# Patient Record
Sex: FEMALE | Race: ASIAN | NOT HISPANIC OR LATINO | Employment: FULL TIME | URBAN - METROPOLITAN AREA
[De-identification: names, ages, dates, MRNs, and addresses within clinical notes are randomized per-mention and may not be internally consistent; named-entity substitution may affect disease eponyms.]

---

## 2020-03-25 ENCOUNTER — TELEPHONE (OUTPATIENT)
Dept: SCHEDULING | Facility: IMAGING CENTER | Age: 50
End: 2020-03-25

## 2020-04-06 ENCOUNTER — OFFICE VISIT (OUTPATIENT)
Dept: MEDICAL GROUP | Facility: PHYSICIAN GROUP | Age: 50
End: 2020-04-06
Payer: COMMERCIAL

## 2020-04-06 VITALS
SYSTOLIC BLOOD PRESSURE: 110 MMHG | HEART RATE: 84 BPM | TEMPERATURE: 97.4 F | DIASTOLIC BLOOD PRESSURE: 80 MMHG | OXYGEN SATURATION: 97 % | HEIGHT: 64 IN | WEIGHT: 168 LBS | RESPIRATION RATE: 16 BRPM | BODY MASS INDEX: 28.68 KG/M2

## 2020-04-06 DIAGNOSIS — G89.29 CHRONIC PAIN OF BOTH KNEES: Primary | ICD-10-CM

## 2020-04-06 DIAGNOSIS — Z12.11 SCREENING FOR COLORECTAL CANCER: ICD-10-CM

## 2020-04-06 DIAGNOSIS — Z12.12 SCREENING FOR COLORECTAL CANCER: ICD-10-CM

## 2020-04-06 DIAGNOSIS — M25.561 CHRONIC PAIN OF BOTH KNEES: Primary | ICD-10-CM

## 2020-04-06 DIAGNOSIS — M25.562 CHRONIC PAIN OF BOTH KNEES: Primary | ICD-10-CM

## 2020-04-06 DIAGNOSIS — Z23 NEED FOR VACCINATION: ICD-10-CM

## 2020-04-06 PROCEDURE — 99203 OFFICE O/P NEW LOW 30 MIN: CPT | Performed by: FAMILY MEDICINE

## 2020-04-06 RX ORDER — TRAMADOL HYDROCHLORIDE 50 MG/1
50 TABLET ORAL NIGHTLY PRN
COMMUNITY
End: 2020-07-08

## 2020-04-06 RX ORDER — IBUPROFEN 800 MG/1
800 TABLET ORAL EVERY 8 HOURS PRN
COMMUNITY
End: 2020-07-09 | Stop reason: SDUPTHER

## 2020-04-06 ASSESSMENT — PATIENT HEALTH QUESTIONNAIRE - PHQ9: CLINICAL INTERPRETATION OF PHQ2 SCORE: 0

## 2020-04-06 NOTE — ASSESSMENT & PLAN NOTE
This is a chronic condition. For the last 6-8 months she has had bilateral knee pain. She had been taking tramadol over 6 months. She has been out for the last month and has been using ibuprofen and tylenol one daily as needed for pain. She states that it seems to be controlling her symptoms. She has not done PT, seen ortho or sports medicine. She did get x-rays which showed arthritis.

## 2020-04-06 NOTE — PROGRESS NOTES
Subjective:     CC:  Diagnoses of Chronic pain of both knees, Screening for colorectal cancer, and Need for vaccination were pertinent to this visit.    HISTORY OF THE PRESENT ILLNESS: Patient is a 50 y.o. female. This pleasant patient is here today to establish care and discuss knee pain. Her prior PCP was with [facility] in [place], [place].    Chronic pain of both knees  This is a chronic condition. For the last 6-8 months she has had bilateral knee pain. She had been taking tramadol over 6 months. She has been out for the last month and has been using ibuprofen and tylenol one daily as needed for pain. She states that it seems to be controlling her symptoms. She has not done PT, seen ortho or sports medicine. She did get x-rays which showed arthritis.      Allergies: Patient has no known allergies.    Current Outpatient Medications Ordered in Epic   Medication Sig Dispense Refill   • ibuprofen (MOTRIN) 800 MG Tab Take 800 mg by mouth every 8 hours as needed.     • tramadol (ULTRAM) 50 MG Tab Take 50 mg by mouth at bedtime as needed.       No current Epic-ordered facility-administered medications on file.        History reviewed. No pertinent past medical history.    Past Surgical History:   Procedure Laterality Date   • CYST EXCISION      scalp - likely sebaceous   • LACERATION REPAIR         Social History     Tobacco Use   • Smoking status: Never Smoker   • Smokeless tobacco: Never Used   Substance Use Topics   • Alcohol use: Not Currently   • Drug use: Not Currently       Social History     Social History Narrative   • Not on file       Family History   Problem Relation Age of Onset   • Lung Disease Mother         asthma   • Diabetes Father    • Cancer Neg Hx    • Heart Disease Neg Hx    • Stroke Neg Hx    • Drug abuse Neg Hx    • Alcohol abuse Neg Hx        Health Maintenance: Requesting records    ROS:   Gen: no fevers/chills  Eyes: no changes in vision  ENT: + hearing loss - tiny bit  Pulm: no cough  CV:  no  palpitations  GI: no diarrhea  : no dysuria  MSk: + myalgias - sometime R trapezius  Skin: no rash  Neuro: no numbness/tingling      Objective:     Exam: /80 (BP Location: Right arm, Patient Position: Sitting, BP Cuff Size: Adult)   Pulse 84   Temp 36.3 °C (97.4 °F) (Temporal)   Resp 16   Wt 76.2 kg (168 lb)   SpO2 97%  There is no height or weight on file to calculate BMI.    General: Normal appearing. No distress.  HEENT: Normocephalic. Eyes conjunctiva clear lids without ptosis, pupils equal and reactive to light accommodation, ears normal shape and contour, canals are clear bilaterally, tympanic membranes are benign, oropharynx is without erythema, edema or exudates.   Neck: Supple without JVD. Thyroid is not enlarged.  Pulmonary: Clear to ausculation.  Normal effort. No rales, ronchi, or wheezing.  Cardiovascular: Regular rate and rhythm without murmur. Carotid and radial pulses are intact and equal bilaterally.  Abdomen: Soft, nontender, nondistended. Normal bowel sounds. Liver and spleen are not palpable  Neurologic: Grossly nonfocal  Lymph: No cervical or supraclavicular lymph nodes are palpable  Skin: Warm and dry.  No obvious lesions.  Musculoskeletal: Normal gait. No extremity cyanosis, clubbing, or edema.  Psych: Normal mood and affect. Alert and oriented x3. Judgment and insight is normal.    Assessment & Plan:   50 y.o. female with the following -    1. Chronic pain of both knees  This is a chronic condition, stable.  She states for last 6-8 months she is had bilateral knee pain which her prior PCP determined secondary to osteoarthritis based on x-ray imaging.  She was on tramadol for approximate 6 months but has been out for the last month.  In the meantime she is been using ibuprofen and Tylenol as needed and she reports is working pretty well to control her pain.  We did discuss the risks of long-term tramadol use and for now we will continue using ibuprofen or Tylenol as needed.  I am  requesting records and I will review the x-rays as well.    2. Screening for colorectal cancer  She is due to start colon cancer screening.  - REFERRAL TO GI FOR COLONOSCOPY    3. Need for vaccination  She initially agreed to a Tdap vaccine but after finding out it by her she declined.    Return in about 3 months (around 7/6/2020) for Annual/wellness visit.    Please note that this dictation was created using voice recognition software. I have made every reasonable attempt to correct obvious errors, but I expect that there are errors of grammar and possibly content that I did not discover before finalizing the note.

## 2020-04-06 NOTE — LETTER
[facility]  Alcira Tse M.D.  [address]  [place] [address]  Fax: [phone]   Authorization for Release/Disclosure of   Protected Health Information   Name: TAI WALDEN : 1970 SSN: [id number]   Address: [address] [place] [address] [address]  [place] [address] Phone:    [phone] (home)    I authorize the entity listed below to release/disclose the PHI below to:   [facility]/Alcira Tse M.D. and Alcria Tse M.D.   Provider or Entity Name:  [place], [place]   Address   City, State, Zip   Phone:      Fax:     Reason for request: continuity of care   Information to be released:    [  ] LAST COLONOSCOPY,  including any PATH REPORT and follow-up  [  ] LAST FIT/COLOGUARD RESULT [  ] LAST DEXA  [X] LAST MAMMOGRAM  [X] LAST PAP  [  ] LAST LABS [  ] RETINA EXAM REPORT  [X] IMMUNIZATION RECORDS  [X] Release all info      [  ] Check here and initial the line next to each item to release ALL health information INCLUDING  _____ Care and treatment for drug and / or alcohol abuse  _____ HIV testing, infection status, or AIDS  _____ Genetic Testing    DATES OF SERVICE OR TIME PERIOD TO BE DISCLOSED: _____________  I understand and acknowledge that:  * This Authorization may be revoked at any time by you in writing, except if your health information has already been used or disclosed.  * Your health information that will be used or disclosed as a result of you signing this authorization could be re-disclosed by the recipient. If this occurs, your re-disclosed health information may no longer be protected by State or Federal laws.  * You may refuse to sign this Authorization. Your refusal will not affect your ability to obtain treatment.  * This Authorization becomes effective upon signing and will  on (date) __________.      If no date is indicated, this Authorization will  one (1) year from the signature date.    Name: Tai Walden    Signature:   Date:     2020          PLEASE FAX REQUESTED RECORDS BACK TO: [phone]

## 2020-04-06 NOTE — LETTER
[facility]  Alcira Tse M.D.  [address]  [place] [address]  Fax: [phone]   Authorization for Release/Disclosure of   Protected Health Information   Name: TAI WALDEN : 1970 SSN: [id number]   Address: [address] [place] [address] [address]  [place] [address] Phone:    [phone] (home)    I authorize the entity listed below to release/disclose the PHI below to:   [facility]/Alcira Tse M.D. and Alcira Tse M.D.   Provider or Entity Name:  [place], [place]    Address   City, State, Zip   Phone:      Fax:     Reason for request: continuity of care   Information to be released:    [  ] LAST COLONOSCOPY,  including any PATH REPORT and follow-up  [  ] LAST FIT/COLOGUARD RESULT [  ] LAST DEXA  [  ] LAST MAMMOGRAM  [  ] LAST PAP  [  ] LAST LABS [  ] RETINA EXAM REPORT  [  ] IMMUNIZATION RECORDS  [XXX] Release all info      [  ] Check here and initial the line next to each item to release ALL health information INCLUDING  _____ Care and treatment for drug and / or alcohol abuse  _____ HIV testing, infection status, or AIDS  _____ Genetic Testing    DATES OF SERVICE OR TIME PERIOD TO BE DISCLOSED: _____________  I understand and acknowledge that:  * This Authorization may be revoked at any time by you in writing, except if your health information has already been used or disclosed.  * Your health information that will be used or disclosed as a result of you signing this authorization could be re-disclosed by the recipient. If this occurs, your re-disclosed health information may no longer be protected by State or Federal laws.  * You may refuse to sign this Authorization. Your refusal will not affect your ability to obtain treatment.  * This Authorization becomes effective upon signing and will  on (date) __________.      If no date is indicated, this Authorization will  one (1) year from the signature date.    Name: Tai Walden    Signature:   Date:      4/6/2020       PLEASE FAX REQUESTED RECORDS BACK TO: [phone]

## 2020-07-06 ENCOUNTER — PATIENT MESSAGE (OUTPATIENT)
Dept: MEDICAL GROUP | Facility: PHYSICIAN GROUP | Age: 50
End: 2020-07-06

## 2020-07-06 ENCOUNTER — TELEPHONE (OUTPATIENT)
Dept: MEDICAL GROUP | Facility: PHYSICIAN GROUP | Age: 50
End: 2020-07-06

## 2020-07-08 ENCOUNTER — OFFICE VISIT (OUTPATIENT)
Dept: MEDICAL GROUP | Facility: PHYSICIAN GROUP | Age: 50
End: 2020-07-08
Payer: COMMERCIAL

## 2020-07-08 ENCOUNTER — HOSPITAL ENCOUNTER (OUTPATIENT)
Facility: MEDICAL CENTER | Age: 50
End: 2020-07-08
Attending: PHYSICIAN ASSISTANT
Payer: COMMERCIAL

## 2020-07-08 VITALS
OXYGEN SATURATION: 98 % | DIASTOLIC BLOOD PRESSURE: 88 MMHG | RESPIRATION RATE: 16 BRPM | BODY MASS INDEX: 29.47 KG/M2 | WEIGHT: 172.6 LBS | HEART RATE: 96 BPM | SYSTOLIC BLOOD PRESSURE: 124 MMHG | HEIGHT: 64 IN | TEMPERATURE: 98.2 F

## 2020-07-08 DIAGNOSIS — G89.29 CHRONIC PAIN OF BOTH KNEES: ICD-10-CM

## 2020-07-08 DIAGNOSIS — M25.561 CHRONIC PAIN OF BOTH KNEES: ICD-10-CM

## 2020-07-08 DIAGNOSIS — M25.562 CHRONIC PAIN OF BOTH KNEES: ICD-10-CM

## 2020-07-08 DIAGNOSIS — Z79.891 CHRONIC USE OF OPIATE DRUG FOR THERAPEUTIC PURPOSE: ICD-10-CM

## 2020-07-08 PROCEDURE — 80307 DRUG TEST PRSMV CHEM ANLYZR: CPT

## 2020-07-08 PROCEDURE — 99000 SPECIMEN HANDLING OFFICE-LAB: CPT | Performed by: PHYSICIAN ASSISTANT

## 2020-07-08 PROCEDURE — 99214 OFFICE O/P EST MOD 30 MIN: CPT | Performed by: PHYSICIAN ASSISTANT

## 2020-07-08 RX ORDER — TRAMADOL HYDROCHLORIDE 50 MG/1
50 TABLET ORAL NIGHTLY PRN
Qty: 15 TAB | Refills: 0 | Status: SHIPPED | OUTPATIENT
Start: 2020-07-08 | End: 2020-07-20 | Stop reason: SDUPTHER

## 2020-07-08 RX ORDER — TRAMADOL HYDROCHLORIDE 50 MG/1
50 TABLET ORAL EVERY 12 HOURS PRN
Qty: 15 TAB | Refills: 0 | Status: SHIPPED | OUTPATIENT
Start: 2020-07-08 | End: 2020-07-08 | Stop reason: CLARIF

## 2020-07-08 NOTE — Clinical Note
Hi Dr. Tse,    I refilled this patient tramadol for 15 days.  Highly emphasized that she keep her appointment on 7/20/2020 to discuss chronic pain management with you in more detail.  I read your note and it appears she had told you that she been out of medication for 1 month but during my appointment she said she had 1 tablet left and has been taking medication every single night.    She is also requesting annual lab work.  Advised her that it would best if you order this lab work so you can discuss it with her during her follow-up appointment.  She also wants her vitamin D level checked.  States she has history of vitamin D deficiency.  She did mention that she is taking over-the-counter vitamin D supplementation but does not remember how much.    I refilled her ibuprofen.  Patient is aware of risk and wanted to continue ibuprofen as a treatment option.  Discussed Tylenol the treatment option with patient.    UDS was obtained during her appointment.  Controlled substance agreement form was also signed.

## 2020-07-09 RX ORDER — IBUPROFEN 800 MG/1
800 TABLET ORAL EVERY 8 HOURS PRN
Qty: 30 TAB | Refills: 0 | Status: SHIPPED | OUTPATIENT
Start: 2020-07-09 | End: 2020-07-20 | Stop reason: SDUPTHER

## 2020-07-09 NOTE — PROGRESS NOTES
Chief Complaint   Patient presents with   • Requesting Labs   • Medication Refill       HISTORY OF PRESENT ILLNESS: Emiliano Walden is an established 50 y.o. female here to discuss the evaluation and management of:    Patient is a pleasant 50-year-old male here today to follow-up on chronic bilateral knee pain.  Patient has an appointment with her PCP, Dr. Tse on 7/20/2020 to discuss pain symptoms in greater detail.  She tells me that she could not wait until that appointment.  She tells me that she is prescribed tramadol 50 mg tab once nightly and prior to establishing care with Dr. Tse medication was managed by her PCP at [facility].  Patient states she has 1 tablet left before prescription runs out.  She is requesting a refill.  States she has been on medication for several years.  Denies misuse or abuse of medication.  Denies alcohol abuse or illicit drug use.  She is also requesting ibuprofen 800 mg twice daily as needed refill.  Patient is aware of risk, benefits, and alternative treatment options for ibuprofen but is insistent that it be refilled.    Patient states he has chronic low-grade aching pain of bilateral knees that can worsen to a deep throbbing aching pain depending on activity.  States knees intermittently swell.  States she has been diagnosed with osteoarthritis of bilateral knees.  She tells me that she uses a brace with she is exercising.  Admits knees can be intermittently tender to palpation.      Patient Active Problem List    Diagnosis Date Noted   • Chronic pain of both knees 04/06/2020       Allergies:Patient has no known allergies.    Current Outpatient Medications   Medication Sig Dispense Refill   • tramadol (ULTRAM) 50 MG Tab Take 1 Tab by mouth at bedtime as needed for Moderate Pain for up to 15 days. 15 Tab 0   • ibuprofen (MOTRIN) 800 MG Tab Take 1 Tab by mouth every 8 hours as needed. 30 Tab 0     No current facility-administered medications for this visit.        Social  "History     Tobacco Use   • Smoking status: Never Smoker   • Smokeless tobacco: Never Used   Substance Use Topics   • Alcohol use: Not Currently   • Drug use: Not Currently       Family Status   Relation Name Status   • Mo  Alive   • Fa  Alive   • Zonia  Alive   • Neg Hx  (Not Specified)     Family History   Problem Relation Age of Onset   • Lung Disease Mother         asthma   • Diabetes Father    • No Known Problems Daughter    • Cancer Neg Hx    • Heart Disease Neg Hx    • Stroke Neg Hx    • Drug abuse Neg Hx    • Alcohol abuse Neg Hx        ROS:  Review of Systems   Constitutional: Negative for fever, chills, weight loss and malaise/fatigue.   HENT: Negative for ear pain, nosebleeds, congestion, sore throat and neck pain.    Eyes: Negative for blurred vision.   Respiratory: Negative for cough, sputum production, shortness of breath and wheezing.    Cardiovascular: Negative for chest pain, palpitations, orthopnea and leg swelling.   Gastrointestinal: Negative for heartburn, nausea, vomiting and abdominal pain.   Genitourinary: Negative for dysuria, urgency and frequency.   Musculoskeletal: Negative for myalgias, back pain.  Positive for bilateral knee pain.  Skin: Negative for rash and itching.   Neurological: Negative for dizziness, tingling, tremors, sensory change, focal weakness and headaches.   Endo/Heme/Allergies: Does not bruise/bleed easily.   Psychiatric/Behavioral: Negative for depression, suicidal ideas and memory loss.  The patient is not nervous/anxious and does not have insomnia.    All other systems reviewed and are negative except as in HPI.    Exam: /88 (BP Location: Left arm, Patient Position: Sitting)   Pulse 96   Temp 36.8 °C (98.2 °F) (Temporal)   Resp 16   Ht 1.613 m (5' 3.5\")   Wt 78.3 kg (172 lb 9.6 oz)   SpO2 98%  Body mass index is 30.1 kg/m².  General: Normal appearing. No distress.  HEENT: Normocephalic. Eyes conjunctiva clear lids without ptosis, pupils equal and reactive to " light accommodation, ears normal shape and contour.  Neck: Supple without JVD. Thyroid is not enlarged.  Pulmonary: Clear to ausculation.  Normal effort. No rales, ronchi, or wheezing.  Cardiovascular: Regular rate and rhythm without murmur.   Abdomen: Soft, nontender, nondistended.   Neurologic: Grossly nonfocal.  Cranial nerves are normal.  Skin: Warm and dry.  No rashes or suspicious skin lesions.  Musculoskeletal: Normal gait. No extremity cyanosis, clubbing, or edema.  Bilateral knees are nontender to palpation.  No signs of erythema/warmth/swelling.  Negative varus and valgus stress test.  Psych: Normal mood and affect. Alert and oriented x3. Judgment and insight is normal.    Medical decision-making and discussion:  1. Chronic pain of both knees  2. Chronic use of opiate drug for therapeutic purpose  Narxcheck could not be reviewed. No information.    Refill tramadol 50 mg tab once nightly as needed for moderate pain symptoms for 15 days.  Discussed with patient that she will need to discuss chronic pain management with her provider.  Controlled substance agreement form has been signed during today's appointment.  UDS was obtained during today's appointment.    Refilled ibuprofen for patient.  Discussed chronic side effects of chronic NSAID use with patient.  Suggest for patient to try Tylenol instead.  Advised patient to not exceed more than 3000 mg of Tylenol in a 24-hour span.    Continue low-impact exercises.  Continue using ice as needed.  Use brace as needed.    - tramadol (ULTRAM) 50 MG Tab; Take 1 Tab by mouth at bedtime as needed for Moderate Pain for up to 15 days.  Dispense: 15 Tab; Refill: 0  - Controlled Substance Treatment Agreement  - Pain Management Screen, Urine; Future      Patient is requesting annual lab work be ordered.  She tells me that she has a history of vitamin D deficiency.  Advised patient I will let her provider know that she is requesting annual lab work and her provider will be  ordering these lab works for her to complete and hopefully they can talk about them during their follow-up appointment on 7/20/2020.  Patient agreed to plan.      Please note that this dictation was created using voice recognition software. I have made every reasonable attempt to correct obvious errors, but I expect that there are errors of grammar and possibly content that I did not discover before finalizing the note.    Assessment/Plan:  1. Chronic pain of both knees  tramadol (ULTRAM) 50 MG Tab    Controlled Substance Treatment Agreement    Pain Management Screen, Urine    DISCONTINUED: tramadol (ULTRAM) 50 MG Tab   2. Chronic use of opiate drug for therapeutic purpose  Controlled Substance Treatment Agreement    Pain Management Screen, Urine       No follow-ups on file.

## 2020-07-09 NOTE — TELEPHONE ENCOUNTER
Received request via: Patient    Was the patient seen in the last year in this department? Yes    Does the patient have an active prescription (recently filled or refills available) for medication(s) requested? No         PLEASE SEND a my chart message when it is sent

## 2020-07-12 LAB
6MAM UR QL: NOT DETECTED
7AMINOCLONAZEPAM UR QL: NOT DETECTED
A-OH ALPRAZ UR QL: NOT DETECTED
ALPRAZ UR QL: NOT DETECTED
AMPHET UR QL SCN: NOT DETECTED
ANNOTATION COMMENT IMP: NORMAL
ANNOTATION COMMENT IMP: NORMAL
BARBITURATES UR QL: NOT DETECTED
BUPRENORPHINE UR QL: NOT DETECTED
BZE UR QL: NOT DETECTED
CARBOXYTHC UR QL: NOT DETECTED
CARISOPRODOL UR QL: NOT DETECTED
CLONAZEPAM UR QL: NOT DETECTED
CODEINE UR QL: NOT DETECTED
DIAZEPAM UR QL: NOT DETECTED
ETHYL GLUCURONIDE UR QL: NOT DETECTED
FENTANYL UR QL: NOT DETECTED
HYDROCODONE UR QL: NOT DETECTED
HYDROMORPHONE UR QL: NOT DETECTED
LORAZEPAM UR QL: NOT DETECTED
MDA UR QL: NOT DETECTED
MDEA UR QL: NOT DETECTED
MDMA UR QL: NOT DETECTED
MEPERIDINE UR QL: NOT DETECTED
METHADONE UR QL: NOT DETECTED
METHAMPHET UR QL: NOT DETECTED
MIDAZOLAM UR QL SCN: NOT DETECTED
MORPHINE UR QL: NOT DETECTED
NORBUPRENORPHINE UR QL CFM: NOT DETECTED
NORDIAZEPAM UR QL: NOT DETECTED
NORFENTANYL UR QL: NOT DETECTED
NORHYDROCODONE UR QL CFM: NOT DETECTED
NOROXYCODONE UR QL CFM: NOT DETECTED
NOROXYMORPH CO100 Q0458: NOT DETECTED
OXAZEPAM UR QL: NOT DETECTED
OXYCODONE UR QL: NOT DETECTED
OXYMORPHONE UR QL: NOT DETECTED
PATHOLOGY STUDY: NORMAL
PCP UR QL: NOT DETECTED
PHENTERMINE UR QL: NOT DETECTED
PPAA UR QL: NOT DETECTED
PROPOXYPH UR QL: NOT DETECTED
SERVICE CMNT-IMP: NORMAL
TAPENADOL OSULF CO200 Q0473: NOT DETECTED
TAPENTADOL UR QL SCN: NOT DETECTED
TEMAZEPAM UR QL: NOT DETECTED
TRAMADOL UR QL: NOT DETECTED
ZOLPIDEM UR QL: NOT DETECTED

## 2020-07-20 ENCOUNTER — OFFICE VISIT (OUTPATIENT)
Dept: MEDICAL GROUP | Facility: PHYSICIAN GROUP | Age: 50
End: 2020-07-20
Payer: COMMERCIAL

## 2020-07-20 VITALS
DIASTOLIC BLOOD PRESSURE: 78 MMHG | HEIGHT: 64 IN | WEIGHT: 169.8 LBS | BODY MASS INDEX: 28.99 KG/M2 | RESPIRATION RATE: 16 BRPM | OXYGEN SATURATION: 96 % | TEMPERATURE: 97.9 F | SYSTOLIC BLOOD PRESSURE: 114 MMHG | HEART RATE: 86 BPM

## 2020-07-20 DIAGNOSIS — M25.561 CHRONIC PAIN OF BOTH KNEES: Primary | ICD-10-CM

## 2020-07-20 DIAGNOSIS — E55.9 VITAMIN D DEFICIENCY: ICD-10-CM

## 2020-07-20 DIAGNOSIS — M25.562 CHRONIC PAIN OF BOTH KNEES: Primary | ICD-10-CM

## 2020-07-20 DIAGNOSIS — G89.29 CHRONIC PAIN OF BOTH KNEES: Primary | ICD-10-CM

## 2020-07-20 DIAGNOSIS — Z00.00 ROUTINE HEALTH MAINTENANCE: ICD-10-CM

## 2020-07-20 PROCEDURE — 99214 OFFICE O/P EST MOD 30 MIN: CPT | Performed by: FAMILY MEDICINE

## 2020-07-20 RX ORDER — TRAMADOL HYDROCHLORIDE 50 MG/1
50 TABLET ORAL NIGHTLY PRN
Qty: 90 TAB | Refills: 0 | Status: SHIPPED | OUTPATIENT
Start: 2020-07-23 | End: 2020-10-21 | Stop reason: SDUPTHER

## 2020-07-20 RX ORDER — IBUPROFEN 800 MG/1
800 TABLET ORAL EVERY 8 HOURS PRN
Qty: 90 TAB | Refills: 1 | Status: SHIPPED | OUTPATIENT
Start: 2020-07-20 | End: 2020-10-05

## 2020-07-20 NOTE — PROGRESS NOTES
Subjective:     CC: med refill    HPI:   Emiliano presents today with     Chronic pain of both knees  This is a chronic condition.  For 9- 12 months she has had bilateral knee pain.  When she saw me in April, 2020 she had told me she been out of her tramadol for a month and been using ibuprofen and Tylenol daily as needed for the pain.  She stated that was controlling her symptoms.  However, she saw another provider in the office a week ago and informed them that she was taking tramadol every day. She states the pain is getting worse, especially the right knee. XR in 2018 showed mild arthritis on the right and moderate arthritis of left knee. She has had corticosteroid injections in her right knee in the past. She would like her tramadol refilled. She takes it once/day.    Last dose of controlled substance: last night  Chronic pain treated with tramdol 50 mg taken once a day    She  reports previous alcohol use.  She  reports previous drug use.    Interval history:   Any major change in health since last appointment? No    Consequences of Chronic Opiate therapy:  (5 A's)  Analgesia: Compared to no treatment or prior treatment, pain is currently significantly improved  Activity: significantly improved  Adverse Events: She denies dry mouth, itchy skin, nausea and sedation  Aberrant Behaviors: She reports she is taking medication as prescribed and is not veering from agreed treatment regimen or provider recommendations. There have been no inappropriate refills or lost/stolen meds reported.  Affect/Mood: Pain is impacting patient's mood.  Patient denies depression/anxiety.    Nonnarcotic treatments that are being used: Tylenol and NSAIDs/RODRIGUES-2.     Last imaging: ordering    Opioid Risk Score: 0    Interpretation of Opioid Risk Score   Score 0-3 = Low risk of abuse. Do UDS at least once per year.  Score 4-7 = Moderate risk of abuse. Do UDS 1-4 times per year.  Score 8+ = High risk of abuse. Refer to specialist.    Last  "order of CONTROLLED SUBSTANCE TREATMENT AGREEMENT was found on 7/8/2020 from Office Visit on 7/8/2020     UDS Summary     Patient has no health maintenance due at this time        Most recent UDS reviewed today and is consistent with prescribed medications.     I have reviewed the medical records, the Prescription Monitoring Program and I have determined that controlled substance treatment is medically indicated.       History reviewed. No pertinent past medical history.    Social History     Tobacco Use   • Smoking status: Never Smoker   • Smokeless tobacco: Never Used   Substance Use Topics   • Alcohol use: Not Currently   • Drug use: Not Currently       Current Outpatient Medications Ordered in Epic   Medication Sig Dispense Refill   • Cholecalciferol (VITAMIN D3 PO) Take  by mouth every day.     • [START ON 7/23/2020] tramadol (ULTRAM) 50 MG Tab Take 1 Tab by mouth at bedtime as needed for Moderate Pain for up to 90 days. 90 Tab 0   • ibuprofen (MOTRIN) 800 MG Tab Take 1 Tab by mouth every 8 hours as needed. 90 Tab 1     No current Epic-ordered facility-administered medications on file.        Allergies:  Patient has no known allergies.    Health Maintenance: deferred for next visit    ROS:  Gen: no fevers/chills  Pulm: no sob  CV: no chest pain    Objective:     Exam:  /78 (BP Location: Left arm, Patient Position: Sitting, BP Cuff Size: Adult)   Pulse 86   Temp 36.6 °C (97.9 °F) (Temporal)   Resp 16   Ht 1.613 m (5' 3.5\")   Wt 77 kg (169 lb 12.8 oz)   SpO2 96%   BMI 29.61 kg/m²  Body mass index is 29.61 kg/m².    Gen: Alert and oriented, No apparent distress.  Neck: Neck is supple without lymphadenopathy.  Lungs: Normal effort, CTA bilaterally, no wheezes, rhonchi, or rales  CV: Regular rate and rhythm. No murmurs, rubs, or gallops.  Ext: No clubbing, cyanosis, edema.    Assessment & Plan:     50 y.o. female with the following -     1. Chronic pain of both knees  This is a chronic condition, " worsened.  She has had arthritis of both knees with chronic pain for some time and her right knee has acutely worsened.  She was off tramadol controlling with Tylenol ibuprofen but that no longer is working and has gotten refill tramadol from 1 of my colleagues in the office.  She would like a refill of the tramadol today.  Additionally, she would like x-rays to check on the knees as her last x-rays were in 2018 to check on the status of the arthritis.  She is also interested in a corticosteroid injection of the right knee.  Informed consent and controlled substance treatment agreement on file.  Urine drug screen up-to-date and appropriate. Obtained and reviewed patient utilization report from [facility] pharmacy database on 7/20/2020 4:41 PM  prior to writing prescription for controlled substance II, III or IV per [place] bill . Based on assessment of the report, the prescription is medically necessary.   - DX-KNEE COMPLETE 4+ RIGHT; Future  - DX-KNEE COMPLETE 4+ LEFT; Future  - tramadol (ULTRAM) 50 MG Tab; Take 1 Tab by mouth at bedtime as needed for Moderate Pain for up to 90 days.  Dispense: 90 Tab; Refill: 0  -We will review x-ray before determining if I can provide a corticosteroid injection  -If I cannot provide the corticosteroid injection, refer to Ortho    2. Vitamin D deficiency  This is a chronic condition, stable.  She has a history of vitamin D deficiency and is on a supplement.  She would like to check her labs to see if the vitamin D supplement is still working.  - VITAMIN D,25 HYDROXY; Future    3. Routine health maintenance  She will be due for her yearly lab work this coming fall and she would like an order today.  - Comp Metabolic Panel; Future  - HEMOGLOBIN A1C; Future  - Lipid Profile; Future      Return in about 3 months (around 10/20/2020) for Annual/wellness visit, Controlled substance.    Please note that this dictation was created using voice recognition software. I have made every  reasonable attempt to correct obvious errors, but I expect that there are errors of grammar and possibly content that I did not discover before finalizing the note.

## 2020-07-20 NOTE — ASSESSMENT & PLAN NOTE
This is a chronic condition.  For 9- 12 months she has had bilateral knee pain.  When she saw me in April, 2020 she had told me she been out of her tramadol for a month and been using ibuprofen and Tylenol daily as needed for the pain.  She stated that was controlling her symptoms.  However, she saw another provider in the office a week ago and informed them that she was taking tramadol every day. She states the pain is getting worse, especially the right knee. XR in 2018 showed mild arthritis on the right and moderate arthritis of left knee. She has had corticosteroid injections in her right knee in the past. She would like her tramadol refilled. She takes it once/day.    Last dose of controlled substance: last night  Chronic pain treated with tramdol 50 mg taken once a day    She  reports previous alcohol use.  She  reports previous drug use.    Interval history:   Any major change in health since last appointment? No    Consequences of Chronic Opiate therapy:  (5 A's)  Analgesia: Compared to no treatment or prior treatment, pain is currently significantly improved  Activity: significantly improved  Adverse Events: She denies dry mouth, itchy skin, nausea and sedation  Aberrant Behaviors: She reports she is taking medication as prescribed and is not veering from agreed treatment regimen or provider recommendations. There have been no inappropriate refills or lost/stolen meds reported.  Affect/Mood: Pain is impacting patient's mood.  Patient denies depression/anxiety.    Nonnarcotic treatments that are being used: Tylenol and NSAIDs/RODRIGUES-2.     Last imaging: ordering    Opioid Risk Score: 0    Interpretation of Opioid Risk Score   Score 0-3 = Low risk of abuse. Do UDS at least once per year.  Score 4-7 = Moderate risk of abuse. Do UDS 1-4 times per year.  Score 8+ = High risk of abuse. Refer to specialist.    Last order of CONTROLLED SUBSTANCE TREATMENT AGREEMENT was found on 7/8/2020 from Office Visit on 7/8/2020      UDS Summary     Patient has no health maintenance due at this time        Most recent UDS reviewed today and is consistent with prescribed medications.     I have reviewed the medical records, the Prescription Monitoring Program and I have determined that controlled substance treatment is medically indicated.

## 2020-08-04 ENCOUNTER — HOSPITAL ENCOUNTER (OUTPATIENT)
Dept: RADIOLOGY | Facility: MEDICAL CENTER | Age: 50
End: 2020-08-04
Attending: FAMILY MEDICINE
Payer: COMMERCIAL

## 2020-08-04 DIAGNOSIS — G89.29 CHRONIC PAIN OF BOTH KNEES: ICD-10-CM

## 2020-08-04 DIAGNOSIS — M25.562 CHRONIC PAIN OF BOTH KNEES: ICD-10-CM

## 2020-08-04 DIAGNOSIS — M25.561 CHRONIC PAIN OF BOTH KNEES: ICD-10-CM

## 2020-08-04 PROCEDURE — 73564 X-RAY EXAM KNEE 4 OR MORE: CPT | Mod: LT

## 2020-08-04 PROCEDURE — 73564 X-RAY EXAM KNEE 4 OR MORE: CPT | Mod: RT

## 2020-10-05 RX ORDER — IBUPROFEN 800 MG/1
TABLET ORAL
Qty: 90 TAB | Refills: 0 | Status: SHIPPED | OUTPATIENT
Start: 2020-10-05 | End: 2020-11-09

## 2020-10-19 DIAGNOSIS — M25.561 CHRONIC PAIN OF BOTH KNEES: ICD-10-CM

## 2020-10-19 DIAGNOSIS — M25.562 CHRONIC PAIN OF BOTH KNEES: ICD-10-CM

## 2020-10-19 DIAGNOSIS — G89.29 CHRONIC PAIN OF BOTH KNEES: ICD-10-CM

## 2020-10-19 RX ORDER — TRAMADOL HYDROCHLORIDE 50 MG/1
50 TABLET ORAL NIGHTLY PRN
Qty: 90 TAB | Refills: 0 | OUTPATIENT
Start: 2020-10-19 | End: 2021-01-17

## 2020-10-21 ENCOUNTER — OFFICE VISIT (OUTPATIENT)
Dept: MEDICAL GROUP | Facility: PHYSICIAN GROUP | Age: 50
End: 2020-10-21
Payer: COMMERCIAL

## 2020-10-21 ENCOUNTER — TELEPHONE (OUTPATIENT)
Dept: MEDICAL GROUP | Facility: PHYSICIAN GROUP | Age: 50
End: 2020-10-21

## 2020-10-21 VITALS
TEMPERATURE: 97.8 F | DIASTOLIC BLOOD PRESSURE: 94 MMHG | RESPIRATION RATE: 16 BRPM | WEIGHT: 172.8 LBS | BODY MASS INDEX: 29.5 KG/M2 | HEIGHT: 64 IN | SYSTOLIC BLOOD PRESSURE: 124 MMHG | HEART RATE: 76 BPM | OXYGEN SATURATION: 98 %

## 2020-10-21 DIAGNOSIS — M25.562 CHRONIC PAIN OF BOTH KNEES: ICD-10-CM

## 2020-10-21 DIAGNOSIS — M25.561 CHRONIC PAIN OF BOTH KNEES: ICD-10-CM

## 2020-10-21 DIAGNOSIS — G89.29 CHRONIC PAIN OF BOTH KNEES: ICD-10-CM

## 2020-10-21 PROCEDURE — 99214 OFFICE O/P EST MOD 30 MIN: CPT | Performed by: FAMILY MEDICINE

## 2020-10-21 RX ORDER — TRAMADOL HYDROCHLORIDE 50 MG/1
50 TABLET ORAL NIGHTLY PRN
Qty: 90 TAB | Refills: 0 | Status: SHIPPED | OUTPATIENT
Start: 2020-10-21 | End: 2021-01-11 | Stop reason: SDUPTHER

## 2020-10-21 NOTE — PROGRESS NOTES
Subjective:     CC: controlled substance refill    HPI:   Emiliano presents today with     Chronic pain of both knees  This is a chronic condition.  For >12 months she has had bilateral knee pain.  She states the pain is getting worse, especially the right knee. XR in 2018 showed mild arthritis on the right and moderate arthritis of left knee. She has had corticosteroid injections in her right knee in the past. She would like her tramadol refilled. She takes it once/day.    Last dose of controlled substance: 2 days ago  Chronic pain treated with tramdol 50 mg taken once a day    She  reports previous alcohol use.  She  reports previous drug use.    Interval history:   Any major change in health since last appointment? No    Consequences of Chronic Opiate therapy:  (5 A's)  Analgesia: Compared to no treatment or prior treatment, pain is currently significantly improved  Activity: significantly improved  Adverse Events: She denies dry mouth, itchy skin, nausea and sedation  Aberrant Behaviors: She reports she is taking medication as prescribed and is not veering from agreed treatment regimen or provider recommendations. There have been no inappropriate refills or lost/stolen meds reported.  Affect/Mood: Pain is impacting patient's mood.  Patient denies depression/anxiety.    Nonnarcotic treatments that are being used: Tylenol and NSAIDs/RODRIGUES-2.     Last imaging: ordering    Opioid Risk Score: 0    Interpretation of Opioid Risk Score   Score 0-3 = Low risk of abuse. Do UDS at least once per year.  Score 4-7 = Moderate risk of abuse. Do UDS 1-4 times per year.  Score 8+ = High risk of abuse. Refer to specialist.    Last order of CONTROLLED SUBSTANCE TREATMENT AGREEMENT was found on 7/8/2020 from Office Visit on 7/8/2020     UDS Summary     Patient has no health maintenance due at this time        Most recent UDS reviewed today and is consistent with prescribed medications.     I have reviewed the medical records, the  "Prescription Monitoring Program and I have determined that controlled substance treatment is medically indicated.       No past medical history on file.    Social History     Tobacco Use   • Smoking status: Never Smoker   • Smokeless tobacco: Never Used   Substance Use Topics   • Alcohol use: Not Currently     Frequency: Never     Drinks per session: Patient refused     Binge frequency: Patient refused   • Drug use: Not Currently       Current Outpatient Medications Ordered in Epic   Medication Sig Dispense Refill   • tramadol (ULTRAM) 50 MG Tab Take 1 Tab by mouth at bedtime as needed for Moderate Pain for up to 90 days. 90 Tab 0   • ibuprofen (MOTRIN) 800 MG Tab TAKE 1 TABLET BY MOUTH EVERY 8 HOURS AS NEEDED 90 Tab 0   • Cholecalciferol (VITAMIN D3 PO) Take  by mouth every day.       No current [facility]-ordered facility-administered medications on file.        Allergies:  Patient has no known allergies.    Health Maintenance: Completed    ROS:  Gen: no fevers/chills  Pulm: no sob  CV: no chest pain    Objective:     Exam:  /94 (BP Location: Left arm, Patient Position: Sitting, BP Cuff Size: Adult)   Pulse 76   Temp 36.6 °C (97.8 °F) (Temporal)   Resp 16   Ht 1.613 m (5' 3.5\")   Wt 78.4 kg (172 lb 12.8 oz)   SpO2 98%   BMI 30.13 kg/m²  Body mass index is 30.13 kg/m².    Gen: Alert and oriented, No apparent distress.  Neck: Neck is supple without lymphadenopathy.  Lungs: Normal effort, CTA bilaterally, no wheezes, rhonchi, or rales  CV: Regular rate and rhythm. No murmurs, rubs, or gallops.  Ext: No clubbing, cyanosis, edema.    Assessment & Plan:     50 y.o. female with the following -     1. Chronic pain of both knees  This is a chronic condition, stable.  She has chronic pain in both knees likely secondary to osteoarthritis.  She currently uses ibuprofen and Tylenol throughout the day and tramadol at night.  She states this regimen is working to control the pain.  She states she ran out of medication 2 " days ago which is a little early based on my prescription.  Informed consent and controlled substance treatment agreement on file.  Urine drug screen is up-to-date and appropriate.Obtained and reviewed patient utilization report from [facility] pharmacy database on 10/21/2020 3:59 PM  prior to writing prescription for controlled substance II, III or IV per [place] bill . Based on assessment of the report, the prescription is medically necessary.   - tramadol (ULTRAM) 50 MG Tab; Take 1 Tab by mouth at bedtime as needed for Moderate Pain for up to 90 days.  Dispense: 90 Tab; Refill: 0      Return in about 3 months (around 1/11/2021) for Controlled substance.    Please note that this dictation was created using voice recognition software. I have made every reasonable attempt to correct obvious errors, but I expect that there are errors of grammar and possibly content that I did not discover before finalizing the note.

## 2020-10-22 NOTE — TELEPHONE ENCOUNTER
DOCUMENTATION OF PAR STATUS:    1. Name of Medication & Dose: tramadol 50 mg      2. Name of Prescription Coverage Company & phone #: [employer]    3. Date Prior Auth Submitted: 10/21/20    4. What information was given to obtain insurance decision? Last 4 progress notes and signed informed consent    5. Prior Auth Status? Pending    6. Patient Notified: no    PA completed via covermymeds and scanned to media.

## 2020-11-09 RX ORDER — IBUPROFEN 800 MG/1
TABLET ORAL
Qty: 90 TAB | Refills: 0 | Status: SHIPPED | OUTPATIENT
Start: 2020-11-09 | End: 2021-02-10

## 2021-01-11 ENCOUNTER — OFFICE VISIT (OUTPATIENT)
Dept: MEDICAL GROUP | Facility: PHYSICIAN GROUP | Age: 51
End: 2021-01-11
Payer: COMMERCIAL

## 2021-01-11 VITALS
TEMPERATURE: 97.2 F | OXYGEN SATURATION: 97 % | SYSTOLIC BLOOD PRESSURE: 110 MMHG | BODY MASS INDEX: 30.42 KG/M2 | WEIGHT: 178.2 LBS | HEART RATE: 80 BPM | DIASTOLIC BLOOD PRESSURE: 80 MMHG | RESPIRATION RATE: 16 BRPM | HEIGHT: 64 IN

## 2021-01-11 DIAGNOSIS — M25.562 CHRONIC PAIN OF BOTH KNEES: ICD-10-CM

## 2021-01-11 DIAGNOSIS — Z12.11 COLON CANCER SCREENING: ICD-10-CM

## 2021-01-11 DIAGNOSIS — G89.29 CHRONIC PAIN OF BOTH KNEES: ICD-10-CM

## 2021-01-11 DIAGNOSIS — E66.9 OBESITY (BMI 30-39.9): ICD-10-CM

## 2021-01-11 DIAGNOSIS — Z79.891 CHRONIC USE OF OPIATE FOR THERAPEUTIC PURPOSE: ICD-10-CM

## 2021-01-11 DIAGNOSIS — Z12.31 ENCOUNTER FOR SCREENING MAMMOGRAM FOR MALIGNANT NEOPLASM OF BREAST: ICD-10-CM

## 2021-01-11 DIAGNOSIS — M25.561 CHRONIC PAIN OF BOTH KNEES: ICD-10-CM

## 2021-01-11 PROCEDURE — 99213 OFFICE O/P EST LOW 20 MIN: CPT | Performed by: FAMILY MEDICINE

## 2021-01-11 RX ORDER — TRAMADOL HYDROCHLORIDE 50 MG/1
50 TABLET ORAL NIGHTLY PRN
Qty: 90 TAB | Refills: 0 | Status: SHIPPED | OUTPATIENT
Start: 2021-01-11 | End: 2021-04-12 | Stop reason: SDUPTHER

## 2021-01-11 RX ORDER — ACETAMINOPHEN 325 MG/1
650 TABLET ORAL
COMMUNITY

## 2021-01-11 ASSESSMENT — PATIENT HEALTH QUESTIONNAIRE - PHQ9: CLINICAL INTERPRETATION OF PHQ2 SCORE: 0

## 2021-01-11 NOTE — ASSESSMENT & PLAN NOTE
Chronic pain recheck for: knee pain  Last dose of controlled substance: last night  Chronic pain treated with tramadol 50 mg taken once a day    She  reports previous alcohol use.  She  reports previous drug use.    Interval history:   Any major change in health since last appointment? No    Consequences of Chronic Opiate therapy:  (5 A's)  Analgesia: Compared to no treatment or prior treatment, pain is currently improved  Activity: improved  Adverse Events: She denies itchy skin, nausea and sedation  Aberrant Behaviors: She reports she is taking medication as prescribed and is not veering from agreed treatment regimen or provider recommendations. There have been no inappropriate refills or lost/stolen meds reported.  Affect/Mood: Pain is impacting patient's mood.  Patient denies depression/anxiety.    Nonnarcotic treatments that are being used: Tylenol and NSAIDs/RODRIGUES-2.     Last imagin2020    Opioid Risk Score: 0    Interpretation of Opioid Risk Score   Score 0-3 = Low risk of abuse. Do UDS at least once per year.  Score 4-7 = Moderate risk of abuse. Do UDS 1-4 times per year.  Score 8+ = High risk of abuse. Refer to specialist.    Last order of CONTROLLED SUBSTANCE TREATMENT AGREEMENT was found on 2020 from Office Visit on 2020     UDS Summary                URINE DRUG SCREEN Next Due 7/3/2021      Done 2020 PAIN MANAGEMENT SCREEN        Most recent UDS reviewed today and is consistent with prescribed medications.     I have reviewed the medical records, the Prescription Monitoring Program and I have determined that controlled substance treatment is medically indicated.

## 2021-01-11 NOTE — ASSESSMENT & PLAN NOTE
This is a chronic condition.  Max weight: 178 lbs (1/2021)  Current weight:   Weight change:  Diet: trying to eat healthy  Exercise: none outside work but does walk 2 miles daily for work

## 2021-01-11 NOTE — ASSESSMENT & PLAN NOTE
This is a chronic condition.  For >12 months she has had bilateral knee pain.  She states the pain is getting worse, especially the right knee. XR in 2018 showed mild arthritis on the right and moderate arthritis of left knee. She has had corticosteroid injections in her right knee in the past. She would like her tramadol refilled. She takes it once/day as needed. She still has 6-7 tablets of her last Rx left. Her last Rx was 83 tabs filled on 10/27/2020.

## 2021-01-11 NOTE — PROGRESS NOTES
Subjective:     CC: tramadol refill    HPI:   mEiliano presents today with     Chronic pain of both knees  This is a chronic condition.  For >12 months she has had bilateral knee pain.  She states the pain is getting worse, especially the right knee. XR in 2018 showed mild arthritis on the right and moderate arthritis of left knee. She has had corticosteroid injections in her right knee in the past. She would like her tramadol refilled. She takes it once/day as needed. She still has 6-7 tablets of her last Rx left. Her last Rx was 83 tabs filled on 10/27/2020.    Chronic use of opiate for therapeutic purpose  Chronic pain recheck for: knee pain  Last dose of controlled substance: last night  Chronic pain treated with tramadol 50 mg taken once a day    She  reports previous alcohol use.  She  reports previous drug use.    Interval history:   Any major change in health since last appointment? No    Consequences of Chronic Opiate therapy:  (5 A's)  Analgesia: Compared to no treatment or prior treatment, pain is currently improved  Activity: improved  Adverse Events: She denies itchy skin, nausea and sedation  Aberrant Behaviors: She reports she is taking medication as prescribed and is not veering from agreed treatment regimen or provider recommendations. There have been no inappropriate refills or lost/stolen meds reported.  Affect/Mood: Pain is impacting patient's mood.  Patient denies depression/anxiety.    Nonnarcotic treatments that are being used: Tylenol and NSAIDs/RODRIGUES-2.     Last imagin2020    Opioid Risk Score: 0    Interpretation of Opioid Risk Score   Score 0-3 = Low risk of abuse. Do UDS at least once per year.  Score 4-7 = Moderate risk of abuse. Do UDS 1-4 times per year.  Score 8+ = High risk of abuse. Refer to specialist.    Last order of CONTROLLED SUBSTANCE TREATMENT AGREEMENT was found on 2020 from Office Visit on 2020     UDS Summary                URINE DRUG SCREEN Next Due 7/3/2021       "Done 7/8/2020 PAIN MANAGEMENT SCREEN        Most recent UDS reviewed today and is consistent with prescribed medications.     I have reviewed the medical records, the Prescription Monitoring Program and I have determined that controlled substance treatment is medically indicated.       Obesity (BMI 30-39.9)  This is a chronic condition.  Max weight: 178 lbs (1/2021)  Current weight:   Weight change:  Diet: trying to eat healthy  Exercise: none outside work but does walk 2 miles daily for work        History reviewed. No pertinent past medical history.    Social History     Tobacco Use   • Smoking status: Never Smoker   • Smokeless tobacco: Never Used   Substance Use Topics   • Alcohol use: Not Currently     Frequency: Never     Drinks per session: Patient refused     Binge frequency: Patient refused   • Drug use: Not Currently       Current Outpatient Medications Ordered in Epic   Medication Sig Dispense Refill   • acetaminophen (TYLENOL) 325 MG Tab Take 650 mg by mouth 2 (two) times a day.     • Multiple Vitamins-Minerals (ONE-A-DAY WOMENS 50 PLUS PO) Take  by mouth.     • traMADol (ULTRAM) 50 MG Tab Take 1 Tab by mouth at bedtime as needed for Moderate Pain for up to 90 days. 90 Tab 0   • ibuprofen (MOTRIN) 800 MG Tab TAKE 1 TABLET BY MOUTH EVERY 8 HOURS AS NEEDED 90 Tab 0   • Cholecalciferol (VITAMIN D3 PO) Take  by mouth every day.       No current [facility]-ordered facility-administered medications on file.        Allergies:  Patient has no known allergies.    Health Maintenance: Completed    ROS:  Gen: no fevers/chills  Pulm: no sob  CV: no chest pain    Objective:     Exam:  /80 (BP Location: Left arm, Patient Position: Sitting, BP Cuff Size: Adult)   Pulse 80   Temp 36.2 °C (97.2 °F) (Temporal)   Resp 16   Ht 1.613 m (5' 3.5\")   Wt 80.8 kg (178 lb 3.2 oz)   SpO2 97%   BMI 31.07 kg/m²  Body mass index is 31.07 kg/m².    Gen: Alert and oriented, No apparent distress.  Neck: Neck is supple without " lymphadenopathy.  Lungs: Normal effort, CTA bilaterally, no wheezes, rhonchi, or rales  CV: Regular rate and rhythm. No murmurs, rubs, or gallops.  Ext: No clubbing, cyanosis, edema.    Assessment & Plan:     51 y.o. female with the following -     1. Chronic pain of both knees  2. Chronic use of opiate for therapeutic purpose  This is a chronic condition, stable.  She has chronic pain in both knees likely secondary to osteoarthritis.  She will use Tylenol in the morning, ibuprofen at night, and tramadol daily at night as needed for severe pain.  She states this regimen continues to work well to control her symptoms.  She does note some constipation with the tramadol and I recommended over-the-counter stool softener.  We did briefly discuss corticosteroid injections to see if we can manage her pain without medications but she wants to think about further.  Informed consent to controlled substance treatment agreement on file.  Urine drug screen up-to-date and appropriate.Obtained and reviewed patient utilization report from [facility] pharmacy database on 1/11/2021 9:59 AM  prior to writing prescription for controlled substance II, III or IV per [place] bill . Based on assessment of the report, the prescription is medically necessary.   - traMADol (ULTRAM) 50 MG Tab; Take 1 Tab by mouth at bedtime as needed for Moderate Pain for up to 90 days.  Dispense: 90 Tab; Refill: 0    3. Obesity (BMI 30-39.9)  This is a chronic condition.  She has been gaining weight steadily since July, 2020 and her BMI is now in the obese range.  She states she is trying to eat healthy but is no longer going to the gym.  She does walk 2 miles a day for work but not exercising outside of work as she used to.  I did encourage her to start working on healthy habits and to work on weight loss.  - Patient identified as having weight management issue.  Appropriate orders and counseling given.    4. Encounter for screening mammogram for  malignant neoplasm of breast  She is due to start mammograms for breast cancer screening which has been ordered.  - MA-SCREENING MAMMO BILAT W/CAD; Future    5. Colon cancer screening  - REFERRAL TO GI FOR COLONOSCOPY    I spent a total of 22 minutes with record review, exam, and communication with the patient, and documentation of this encounter.     Return in about 3 months (around 4/11/2021) for Controlled substance.    Please note that this dictation was created using voice recognition software. I have made every reasonable attempt to correct obvious errors, but I expect that there are errors of grammar and possibly content that I did not discover before finalizing the note.

## 2021-02-08 ENCOUNTER — HOSPITAL ENCOUNTER (OUTPATIENT)
Dept: RADIOLOGY | Facility: MEDICAL CENTER | Age: 51
End: 2021-02-08
Attending: FAMILY MEDICINE
Payer: COMMERCIAL

## 2021-02-08 DIAGNOSIS — Z12.31 VISIT FOR SCREENING MAMMOGRAM: ICD-10-CM

## 2021-02-08 PROCEDURE — 77063 BREAST TOMOSYNTHESIS BI: CPT

## 2021-02-10 RX ORDER — IBUPROFEN 800 MG/1
TABLET ORAL
Qty: 90 TABLET | Refills: 0 | Status: SHIPPED | OUTPATIENT
Start: 2021-02-10

## 2021-04-12 ENCOUNTER — OFFICE VISIT (OUTPATIENT)
Dept: MEDICAL GROUP | Facility: PHYSICIAN GROUP | Age: 51
End: 2021-04-12
Payer: COMMERCIAL

## 2021-04-12 VITALS
DIASTOLIC BLOOD PRESSURE: 96 MMHG | SYSTOLIC BLOOD PRESSURE: 144 MMHG | RESPIRATION RATE: 12 BRPM | HEART RATE: 86 BPM | OXYGEN SATURATION: 99 % | BODY MASS INDEX: 30.15 KG/M2 | WEIGHT: 176.6 LBS | TEMPERATURE: 97.5 F | HEIGHT: 64 IN

## 2021-04-12 DIAGNOSIS — M25.562 CHRONIC PAIN OF BOTH KNEES: ICD-10-CM

## 2021-04-12 DIAGNOSIS — M25.561 CHRONIC PAIN OF BOTH KNEES: ICD-10-CM

## 2021-04-12 DIAGNOSIS — Z79.891 CHRONIC USE OF OPIATE FOR THERAPEUTIC PURPOSE: ICD-10-CM

## 2021-04-12 DIAGNOSIS — G89.29 CHRONIC PAIN OF BOTH KNEES: ICD-10-CM

## 2021-04-12 PROCEDURE — 20610 DRAIN/INJ JOINT/BURSA W/O US: CPT | Mod: 50 | Performed by: FAMILY MEDICINE

## 2021-04-12 RX ORDER — TRAMADOL HYDROCHLORIDE 50 MG/1
50 TABLET ORAL NIGHTLY PRN
Qty: 90 TABLET | Refills: 0 | Status: SHIPPED | OUTPATIENT
Start: 2021-04-12 | End: 2021-07-11

## 2021-04-12 RX ORDER — LIDOCAINE HYDROCHLORIDE 20 MG/ML
4 INJECTION, SOLUTION EPIDURAL; INFILTRATION; INTRACAUDAL; PERINEURAL ONCE
Status: COMPLETED | OUTPATIENT
Start: 2021-04-12 | End: 2021-04-12

## 2021-04-12 RX ORDER — TRIAMCINOLONE ACETONIDE 40 MG/ML
40 INJECTION, SUSPENSION INTRA-ARTICULAR; INTRAMUSCULAR ONCE
Status: COMPLETED | OUTPATIENT
Start: 2021-04-12 | End: 2021-04-12

## 2021-04-12 RX ADMIN — LIDOCAINE HYDROCHLORIDE 4 ML: 20 INJECTION, SOLUTION EPIDURAL; INFILTRATION; INTRACAUDAL; PERINEURAL at 11:05

## 2021-04-12 RX ADMIN — TRIAMCINOLONE ACETONIDE 40 MG: 40 INJECTION, SUSPENSION INTRA-ARTICULAR; INTRAMUSCULAR at 11:08

## 2021-04-12 RX ADMIN — TRIAMCINOLONE ACETONIDE 40 MG: 40 INJECTION, SUSPENSION INTRA-ARTICULAR; INTRAMUSCULAR at 11:07

## 2021-04-12 RX ADMIN — LIDOCAINE HYDROCHLORIDE 4 ML: 20 INJECTION, SOLUTION EPIDURAL; INFILTRATION; INTRACAUDAL; PERINEURAL at 11:06

## 2021-04-12 NOTE — PROGRESS NOTES
Subjective:     CC: knee injections, tramadol refill    HPI:   Emiliano presents today with     Chronic pain of both knees  This is a chronic condition.  For >12 months she has had bilateral knee pain.  She states the pain is getting worse, especially the right knee. XR in 2018 showed mild arthritis on the right and moderate arthritis of left knee. She has had corticosteroid injections in her right knee in the past. She would like her tramadol refilled. She takes it once/day as needed. Her last Rx was 90 tabs filled on 2021. She reports she still has 4-5 tabs left.    Chronic use of opiate for therapeutic purpose  Chronic pain recheck for: knee pain  Last dose of controlled substance: last night  Chronic pain treated with tramadol 50 mg taken once a day    She  reports previous alcohol use.  She  reports previous drug use.    Interval history:   Any major change in health since last appointment? No    Consequences of Chronic Opiate therapy:  (5 A's)  Analgesia: Compared to no treatment or prior treatment, pain is currently improved  Activity: improved  Adverse Events: She denies constipation, dry mouth, itchy skin, nausea and sedation  Aberrant Behaviors: She reports she is taking medication as prescribed and is not veering from agreed treatment regimen or provider recommendations. There have been no inappropriate refills or lost/stolen meds reported.  Affect/Mood: Pain is impacting patient's mood.  Patient denies depression/anxiety.    Nonnarcotic treatments that are being used: Tylenol and NSAIDs/RODRIGUES-2.     Last imagin2020    Opioid Risk Score: 0    Interpretation of Opioid Risk Score   Score 0-3 = Low risk of abuse. Do UDS at least once per year.  Score 4-7 = Moderate risk of abuse. Do UDS 1-4 times per year.  Score 8+ = High risk of abuse. Refer to specialist.    Last order of CONTROLLED SUBSTANCE TREATMENT AGREEMENT was found on 2020 from Office Visit on 2020     UDS Summary                URINE  "DRUG SCREEN Next Due 7/3/2021      Done 7/8/2020 PAIN MANAGEMENT SCREEN        Most recent UDS reviewed today and is consistent with prescribed medications.     I have reviewed the medical records, the Prescription Monitoring Program and I have determined that controlled substance treatment is medically indicated.       Current Outpatient Medications Ordered in Epic   Medication Sig Dispense Refill   • traMADol (ULTRAM) 50 MG Tab Take 1 tablet by mouth at bedtime as needed for Moderate Pain for up to 90 days. 90 tablet 0   • ibuprofen (MOTRIN) 800 MG Tab TAKE 1 TABLET BY MOUTH EVERY 8 HOURS AS NEEDED 90 tablet 0   • acetaminophen (TYLENOL) 325 MG Tab Take 650 mg by mouth 2 (two) times a day.     • Multiple Vitamins-Minerals (ONE-A-DAY WOMENS 50 PLUS PO) Take  by mouth.     • Cholecalciferol (VITAMIN D3 PO) Take  by mouth every day.       Current Facility-Administered Medications Ordered in Epic   Medication Dose Route Frequency Provider Last Rate Last Admin   • triamcinolone acetonide (KENALOG-40) injection 40 mg  40 mg Intra-articular Once Alcira Tse M.D.       • triamcinolone acetonide (KENALOG-40) injection 40 mg  40 mg Intra-articular Once Alcira Tse M.D.       • lidocaine (XYLOCAINE) 1 % injection 1 mL  1 mL Injection Once Alcira Tse M.D.       • lidocaine (XYLOCAINE) 1 % injection 1 mL  1 mL Injection Once Alcira Tse M.D.           Health Maintenance: Completed    ROS:  Gen: no fevers/chills  Pulm: no cough      Objective:     Exam:  /96 (BP Location: Left arm, Patient Position: Sitting, BP Cuff Size: Adult)   Pulse 86   Temp 36.4 °C (97.5 °F) (Temporal)   Resp 12   Ht 1.613 m (5' 3.5\")   Wt 80.1 kg (176 lb 9.6 oz)   SpO2 99%   BMI 30.79 kg/m²  Body mass index is 30.79 kg/m².    Constitutional: Alert, no distress, well-groomed.  Skin: Warm, dry, good turgor, no rashes in visible areas.  Eye: Equal, round and reactive, conjunctiva clear, lids normal.  ENMT: Lips without " lesions, good dentition, moist mucous membranes.  Neck: Trachea midline, no masses, no thyromegaly.  Respiratory: Unlabored respiratory effort, no cough.  MSK: Normal gait, moves all extremities.  Neuro: Grossly non-focal.   Psych: Alert and oriented x3, normal affect and mood.    Assessment & Plan:     51 y.o. female with the following -     1. Chronic pain of both knees  2. Chronic use of opiate for therapeutic purpose  This is a chronic condition, stable.  She has chronic pain in both knees likely secondary to osteoarthritis.  She will use Tylenol in the morning, ibuprofen at night, and tramadol daily at night as needed for severe pain.  She states this regimen continues to work well to control her symptoms.  Bilateral knee corticosteroid injections were performed today, see procedure note for details.  Informed consent to controlled substance treatment agreement on file.  Urine drug screen is up-to-date and appropriate.Obtained and reviewed patient utilization report from [facility] pharmacy database on 4/12/2021 11:01 AM  prior to writing prescription for controlled substance II, III or IV per [place] bill . Based on assessment of the report, the prescription is medically necessary.   - traMADol (ULTRAM) 50 MG Tab; Take 1 tablet by mouth at bedtime as needed for Moderate Pain for up to 90 days.  Dispense: 90 tablet; Refill: 0  - triamcinolone acetonide (KENALOG-40) injection 40 mg  - triamcinolone acetonide (KENALOG-40) injection 40 mg  - lidocaine PF (XYLOCAINE-MPF) 2 % injection PF 4 mL  - lidocaine PF (XYLOCAINE-MPF) 2 % injection PF 4 mL    Return in about 3 months (around 7/12/2021) for Controlled substance.    Please note that this dictation was created using voice recognition software. I have made every reasonable attempt to correct obvious errors, but I expect that there are errors of grammar and possibly content that I did not discover before finalizing the note.

## 2021-04-12 NOTE — ASSESSMENT & PLAN NOTE
This is a chronic condition.  For >12 months she has had bilateral knee pain.  She states the pain is getting worse, especially the right knee. XR in 2018 showed mild arthritis on the right and moderate arthritis of left knee. She has had corticosteroid injections in her right knee in the past. She would like her tramadol refilled. She takes it once/day as needed. Her last Rx was 90 tabs filled on 1/11/2021. She reports she still has 4-5 tabs left.

## 2021-04-12 NOTE — PROCEDURES
PROCEDURE NOTE:  Discussed risk and benefit and patient agrees to kenalog injection of bilateral knee.     Right knee  The joint was prepped and draped in usual sterile fashion. A 25 gauge needle was inserted into the anterior lateral aspect of the joint. 4 cc of 2% lidocaine without epi and 40 mg of kenalog (1 cc) was then injected into the joint. The area was cleaned with alcohol swab and band-aid was applied. Patient tolerated procedure well with no complications.    Left knee  The joint was prepped and draped in usual sterile fashion. A 27 gauge needle was inserted into the anterior lateral aspect of the joint. 4 cc of 2% lidocaine without epi and 40 mg of kenalog (1 cc) was then injected into the joint. The area was cleaned with alcohol swab and band-aid was applied. Patient tolerated procedure well with no complications.

## 2021-04-12 NOTE — ASSESSMENT & PLAN NOTE
Chronic pain recheck for: knee pain  Last dose of controlled substance: last night  Chronic pain treated with tramadol 50 mg taken once a day    She  reports previous alcohol use.  She  reports previous drug use.    Interval history:   Any major change in health since last appointment? No    Consequences of Chronic Opiate therapy:  (5 A's)  Analgesia: Compared to no treatment or prior treatment, pain is currently improved  Activity: improved  Adverse Events: She denies constipation, dry mouth, itchy skin, nausea and sedation  Aberrant Behaviors: She reports she is taking medication as prescribed and is not veering from agreed treatment regimen or provider recommendations. There have been no inappropriate refills or lost/stolen meds reported.  Affect/Mood: Pain is impacting patient's mood.  Patient denies depression/anxiety.    Nonnarcotic treatments that are being used: Tylenol and NSAIDs/RODRIGUES-2.     Last imagin2020    Opioid Risk Score: 0    Interpretation of Opioid Risk Score   Score 0-3 = Low risk of abuse. Do UDS at least once per year.  Score 4-7 = Moderate risk of abuse. Do UDS 1-4 times per year.  Score 8+ = High risk of abuse. Refer to specialist.    Last order of CONTROLLED SUBSTANCE TREATMENT AGREEMENT was found on 2020 from Office Visit on 2020     UDS Summary                URINE DRUG SCREEN Next Due 7/3/2021      Done 2020 PAIN MANAGEMENT SCREEN        Most recent UDS reviewed today and is consistent with prescribed medications.     I have reviewed the medical records, the Prescription Monitoring Program and I have determined that controlled substance treatment is medically indicated.

## 2021-04-19 ENCOUNTER — HOSPITAL ENCOUNTER (OUTPATIENT)
Dept: LAB | Facility: MEDICAL CENTER | Age: 51
End: 2021-04-19
Attending: FAMILY MEDICINE
Payer: COMMERCIAL

## 2021-04-19 DIAGNOSIS — Z00.00 ROUTINE HEALTH MAINTENANCE: ICD-10-CM

## 2021-04-19 DIAGNOSIS — E55.9 VITAMIN D DEFICIENCY: ICD-10-CM

## 2021-04-19 LAB
ALBUMIN SERPL BCP-MCNC: 4.2 G/DL (ref 3.2–4.9)
ALBUMIN/GLOB SERPL: 1.3 G/DL
ALP SERPL-CCNC: 108 U/L (ref 30–99)
ALT SERPL-CCNC: 23 U/L (ref 2–50)
ANION GAP SERPL CALC-SCNC: 8 MMOL/L (ref 7–16)
AST SERPL-CCNC: 18 U/L (ref 12–45)
BILIRUB SERPL-MCNC: 0.2 MG/DL (ref 0.1–1.5)
BUN SERPL-MCNC: 11 MG/DL (ref 8–22)
CALCIUM SERPL-MCNC: 9.3 MG/DL (ref 8.5–10.5)
CHLORIDE SERPL-SCNC: 101 MMOL/L (ref 96–112)
CHOLEST SERPL-MCNC: 199 MG/DL (ref 100–199)
CO2 SERPL-SCNC: 26 MMOL/L (ref 20–33)
CREAT SERPL-MCNC: 0.75 MG/DL (ref 0.5–1.4)
EST. AVERAGE GLUCOSE BLD GHB EST-MCNC: 120 MG/DL
GLOBULIN SER CALC-MCNC: 3.2 G/DL (ref 1.9–3.5)
GLUCOSE SERPL-MCNC: 104 MG/DL (ref 65–99)
HBA1C MFR BLD: 5.8 % (ref 4–5.6)
HDLC SERPL-MCNC: 76 MG/DL
LDLC SERPL CALC-MCNC: 103 MG/DL
POTASSIUM SERPL-SCNC: 4.2 MMOL/L (ref 3.6–5.5)
PROT SERPL-MCNC: 7.4 G/DL (ref 6–8.2)
SODIUM SERPL-SCNC: 135 MMOL/L (ref 135–145)
TRIGL SERPL-MCNC: 99 MG/DL (ref 0–149)

## 2021-04-19 PROCEDURE — 82306 VITAMIN D 25 HYDROXY: CPT

## 2021-04-19 PROCEDURE — 80053 COMPREHEN METABOLIC PANEL: CPT

## 2021-04-19 PROCEDURE — 83036 HEMOGLOBIN GLYCOSYLATED A1C: CPT

## 2021-04-19 PROCEDURE — 80061 LIPID PANEL: CPT

## 2021-04-19 PROCEDURE — 36415 COLL VENOUS BLD VENIPUNCTURE: CPT

## 2021-04-21 LAB — 25(OH)D3 SERPL-MCNC: 38 NG/ML (ref 30–80)

## 2021-07-08 DIAGNOSIS — M25.561 CHRONIC PAIN OF BOTH KNEES: ICD-10-CM

## 2021-07-08 DIAGNOSIS — Z79.891 CHRONIC USE OF OPIATE FOR THERAPEUTIC PURPOSE: ICD-10-CM

## 2021-07-08 DIAGNOSIS — M25.562 CHRONIC PAIN OF BOTH KNEES: ICD-10-CM

## 2021-07-08 DIAGNOSIS — G89.29 CHRONIC PAIN OF BOTH KNEES: ICD-10-CM

## 2021-07-09 RX ORDER — TRAMADOL HYDROCHLORIDE 50 MG/1
50 TABLET ORAL NIGHTLY PRN
Qty: 90 TABLET | Refills: 0 | OUTPATIENT
Start: 2021-07-09 | End: 2021-10-07

## 2021-07-12 ENCOUNTER — APPOINTMENT (OUTPATIENT)
Dept: MEDICAL GROUP | Facility: PHYSICIAN GROUP | Age: 51
End: 2021-07-12
Payer: COMMERCIAL

## 2022-06-19 NOTE — ASSESSMENT & PLAN NOTE
This is a chronic condition.  For >12 months she has had bilateral knee pain.  She states the pain is getting worse, especially the right knee. XR in 2018 showed mild arthritis on the right and moderate arthritis of left knee. She has had corticosteroid injections in her right knee in the past. She would like her tramadol refilled. She takes it once/day.    Last dose of controlled substance: 2 days ago  Chronic pain treated with tramdol 50 mg taken once a day    She  reports previous alcohol use.  She  reports previous drug use.    Interval history:   Any major change in health since last appointment? No    Consequences of Chronic Opiate therapy:  (5 A's)  Analgesia: Compared to no treatment or prior treatment, pain is currently significantly improved  Activity: significantly improved  Adverse Events: She denies dry mouth, itchy skin, nausea and sedation  Aberrant Behaviors: She reports she is taking medication as prescribed and is not veering from agreed treatment regimen or provider recommendations. There have been no inappropriate refills or lost/stolen meds reported.  Affect/Mood: Pain is impacting patient's mood.  Patient denies depression/anxiety.    Nonnarcotic treatments that are being used: Tylenol and NSAIDs/RODRIGUES-2.     Last imaging: ordering    Opioid Risk Score: 0    Interpretation of Opioid Risk Score   Score 0-3 = Low risk of abuse. Do UDS at least once per year.  Score 4-7 = Moderate risk of abuse. Do UDS 1-4 times per year.  Score 8+ = High risk of abuse. Refer to specialist.    Last order of CONTROLLED SUBSTANCE TREATMENT AGREEMENT was found on 7/8/2020 from Office Visit on 7/8/2020     UDS Summary     Patient has no health maintenance due at this time        Most recent UDS reviewed today and is consistent with prescribed medications.     I have reviewed the medical records, the Prescription Monitoring Program and I have determined that controlled substance treatment is medically indicated.    Ambulatory

## 2023-08-24 ENCOUNTER — PATIENT MESSAGE (OUTPATIENT)
Dept: HEALTH INFORMATION MANAGEMENT | Facility: OTHER | Age: 53
End: 2023-08-24